# Patient Record
Sex: FEMALE | Race: WHITE
[De-identification: names, ages, dates, MRNs, and addresses within clinical notes are randomized per-mention and may not be internally consistent; named-entity substitution may affect disease eponyms.]

---

## 2018-12-28 ENCOUNTER — HOSPITAL ENCOUNTER (EMERGENCY)
Dept: HOSPITAL 47 - EC | Age: 28
Discharge: HOME | End: 2018-12-28
Payer: COMMERCIAL

## 2018-12-28 VITALS — TEMPERATURE: 98.1 F

## 2018-12-28 VITALS — RESPIRATION RATE: 16 BRPM | HEART RATE: 66 BPM | SYSTOLIC BLOOD PRESSURE: 113 MMHG | DIASTOLIC BLOOD PRESSURE: 52 MMHG

## 2018-12-28 DIAGNOSIS — F32.9: Primary | ICD-10-CM

## 2018-12-28 DIAGNOSIS — F41.9: ICD-10-CM

## 2018-12-28 DIAGNOSIS — F17.200: ICD-10-CM

## 2018-12-28 DIAGNOSIS — Z79.899: ICD-10-CM

## 2018-12-28 PROCEDURE — 80306 DRUG TEST PRSMV INSTRMNT: CPT

## 2018-12-28 PROCEDURE — 99285 EMERGENCY DEPT VISIT HI MDM: CPT

## 2018-12-28 NOTE — ED
General Adult HPI





- General


Chief complaint: Psychiatric Symptoms


Stated complaint: petitioned


Time Seen by Provider: 18 13:21


Source: patient, police, RN notes reviewed, old records reviewed


Mode of arrival: ambulatory


Limitations: no limitations





- History of Present Illness


Initial comments: 





28-year-old female presents for psychiatric evaluation.  Patient was petitioned 

by her mother and father.  She does have past medical history of depression, 

was placed on Wellbutrin approximately one month ago.  The petitioned states 

that the patient has had increased aggression, she reportedly text her 

boyfriend that she was suicidal.  Patient denies this.  Patient denies any 

suicidal homicidal ideation.  Denies suicide attempt.  No past medical history, 

no pain complaints, no physical complaints today.  Patient is agreeable and 

compliant with history of physical.





- Related Data


 Home Medications











 Medication  Instructions  Recorded  Confirmed


 


Ergocalciferol (Vitamin D2) 50,000 unit PO GALEANA 18





[Vitamin D2]   


 


buPROPion HCL [Wellbutrin SR] 150 mg PO Q12H 18











 Allergies











Allergy/AdvReac Type Severity Reaction Status Date / Time


 


No Known Allergies Allergy   Verified 18 13:32














Review of Systems


ROS Statement: 


Those systems with pertinent positive or pertinent negative responses have been 

documented in the HPI.





ROS Other: All systems not noted in ROS Statement are negative.





Past Medical History


Past Medical History: No Reported History


History of Any Multi-Drug Resistant Organisms: None Reported


Past Surgical History:  Section


Past Psychological History: Anxiety, Depression


Smoking Status: Current every day smoker


Past Alcohol Use History: Occasional


Past Drug Use History: None Reported





General Exam


Limitations: no limitations


General appearance: alert, in no apparent distress


Head exam: Present: atraumatic, normocephalic


Eye exam: Present: normal appearance, PERRL


ENT exam: Present: normal exam


Neck exam: Present: normal inspection.  Absent: tenderness, meningismus


Respiratory exam: Present: normal lung sounds bilaterally.  Absent: respiratory 

distress, wheezes


Cardiovascular Exam: Present: regular rate, normal rhythm


GI/Abdominal exam: Present: soft.  Absent: distended, tenderness, guarding


Extremities exam: Present: normal inspection, normal capillary refill


Back exam: Present: normal inspection


Neurological exam: Present: alert, oriented X3


Psychiatric exam: Present: normal affect, normal mood.  Absent: agitated, 

anxious, homicidal ideation, suicidal ideation


Skin exam: Present: warm, dry, intact.  Absent: cyanosis, diaphoretic





Course


 Vital Signs











  18





  12:56


 


Temperature 98.1 F


 


Pulse Rate 85


 


Respiratory 18





Rate 


 


Blood Pressure 116/74


 


O2 Sat by Pulse 99





Oximetry 














Medical Decision Making





- Medical Decision Making





28-year-old female presents for petitioned mental evaluation.  Patient is 

denying any suicidal ideation.  She is calm and appropriate throughout my 

interaction.  She is evaluated by EPS, plan for discharge, she has outpatient 

follow-up in one week.  Patient will return with worsening or changing symptoms.





- Lab Data


 Lab Results











  18 Range/Units





  13:20 


 


Urine Opiates Screen  Not Detected  (NotDetected)  


 


Ur Oxycodone Screen  Not Detected  (NotDetected)  


 


Urine Methadone Screen  Not Detected  (NotDetected)  


 


Ur Propoxyphene Screen  Not Detected  (NotDetected)  


 


Ur Barbiturates Screen  Not Detected  (NotDetected)  


 


U Tricyclic Antidepress  Not Detected  (NotDetected)  


 


Ur Phencyclidine Scrn  Not Detected  (NotDetected)  


 


Ur Amphetamines Screen  Not Detected  (NotDetected)  


 


U Methamphetamines Scrn  Not Detected  (NotDetected)  


 


U Benzodiazepines Scrn  Not Detected  (NotDetected)  


 


Urine Cocaine Screen  Not Detected  (NotDetected)  


 


U Marijuana (THC) Screen  Detected H  (NotDetected)  














Disposition


Clinical Impression: 


 Depression





Disposition: HOME SELF-CARE


Condition: Good


Instructions:  Depression (ED)


Additional Instructions: 


Please follow up with community mental health.


Is patient prescribed a controlled substance at d/c from ED?: No


Referrals: 


Nonstaff,Physician [Primary Care Provider] - 1-2 days


Time of Disposition: 16:31

## 2020-05-13 ENCOUNTER — HOSPITAL ENCOUNTER (EMERGENCY)
Dept: HOSPITAL 47 - EC | Age: 30
Discharge: HOME | End: 2020-05-13
Payer: COMMERCIAL

## 2020-05-13 VITALS — RESPIRATION RATE: 18 BRPM | TEMPERATURE: 100.3 F

## 2020-05-13 VITALS — SYSTOLIC BLOOD PRESSURE: 119 MMHG | DIASTOLIC BLOOD PRESSURE: 74 MMHG

## 2020-05-13 VITALS — HEART RATE: 99 BPM

## 2020-05-13 DIAGNOSIS — N39.0: ICD-10-CM

## 2020-05-13 DIAGNOSIS — Z20.828: ICD-10-CM

## 2020-05-13 DIAGNOSIS — Z79.899: ICD-10-CM

## 2020-05-13 DIAGNOSIS — N12: Primary | ICD-10-CM

## 2020-05-13 DIAGNOSIS — M54.9: ICD-10-CM

## 2020-05-13 DIAGNOSIS — F17.200: ICD-10-CM

## 2020-05-13 DIAGNOSIS — F32.9: ICD-10-CM

## 2020-05-13 DIAGNOSIS — R51: ICD-10-CM

## 2020-05-13 LAB
ANION GAP SERPL CALC-SCNC: 7 MMOL/L
BASOPHILS # BLD AUTO: 0 K/UL (ref 0–0.2)
BASOPHILS NFR BLD AUTO: 0 %
BUN SERPL-SCNC: 8 MG/DL (ref 7–17)
CALCIUM SPEC-MCNC: 8.9 MG/DL (ref 8.4–10.2)
CHLORIDE SERPL-SCNC: 104 MMOL/L (ref 98–107)
CO2 SERPL-SCNC: 25 MMOL/L (ref 22–30)
EOSINOPHIL # BLD AUTO: 0.1 K/UL (ref 0–0.7)
EOSINOPHIL NFR BLD AUTO: 1 %
ERYTHROCYTE [DISTWIDTH] IN BLOOD BY AUTOMATED COUNT: 4.11 M/UL (ref 3.8–5.4)
ERYTHROCYTE [DISTWIDTH] IN BLOOD: 12.8 % (ref 11.5–15.5)
GLUCOSE SERPL-MCNC: 102 MG/DL (ref 74–99)
HCT VFR BLD AUTO: 39.7 % (ref 34–46)
HGB BLD-MCNC: 12.7 GM/DL (ref 11.4–16)
LYMPHOCYTES # SPEC AUTO: 0.7 K/UL (ref 1–4.8)
LYMPHOCYTES NFR SPEC AUTO: 5 %
MAGNESIUM SPEC-SCNC: 2 MG/DL (ref 1.6–2.3)
MCH RBC QN AUTO: 30.8 PG (ref 25–35)
MCHC RBC AUTO-ENTMCNC: 31.8 G/DL (ref 31–37)
MCV RBC AUTO: 96.6 FL (ref 80–100)
MONOCYTES # BLD AUTO: 1 K/UL (ref 0–1)
MONOCYTES NFR BLD AUTO: 8 %
NEUTROPHILS # BLD AUTO: 11.2 K/UL (ref 1.3–7.7)
NEUTROPHILS NFR BLD AUTO: 86 %
PH UR: 5.5 [PH] (ref 5–8)
PLATELET # BLD AUTO: 262 K/UL (ref 150–450)
POTASSIUM SERPL-SCNC: 3.8 MMOL/L (ref 3.5–5.1)
PROT UR QL: (no result)
RBC UR QL: 5 /HPF (ref 0–5)
SODIUM SERPL-SCNC: 136 MMOL/L (ref 137–145)
SP GR UR: 1.02 (ref 1–1.03)
SQUAMOUS UR QL AUTO: 7 /HPF (ref 0–4)
UROBILINOGEN UR QL STRIP: <2 MG/DL (ref ?–2)
WBC # BLD AUTO: 13.1 K/UL (ref 3.8–10.6)
WBC # UR AUTO: 84 /HPF (ref 0–5)

## 2020-05-13 PROCEDURE — 85025 COMPLETE CBC W/AUTO DIFF WBC: CPT

## 2020-05-13 PROCEDURE — 87086 URINE CULTURE/COLONY COUNT: CPT

## 2020-05-13 PROCEDURE — 87040 BLOOD CULTURE FOR BACTERIA: CPT

## 2020-05-13 PROCEDURE — 81025 URINE PREGNANCY TEST: CPT

## 2020-05-13 PROCEDURE — 96374 THER/PROPH/DIAG INJ IV PUSH: CPT

## 2020-05-13 PROCEDURE — 86140 C-REACTIVE PROTEIN: CPT

## 2020-05-13 PROCEDURE — 96375 TX/PRO/DX INJ NEW DRUG ADDON: CPT

## 2020-05-13 PROCEDURE — 96361 HYDRATE IV INFUSION ADD-ON: CPT

## 2020-05-13 PROCEDURE — 87635 SARS-COV-2 COVID-19 AMP PRB: CPT

## 2020-05-13 PROCEDURE — 81001 URINALYSIS AUTO W/SCOPE: CPT

## 2020-05-13 PROCEDURE — 99284 EMERGENCY DEPT VISIT MOD MDM: CPT

## 2020-05-13 PROCEDURE — 71045 X-RAY EXAM CHEST 1 VIEW: CPT

## 2020-05-13 PROCEDURE — 36415 COLL VENOUS BLD VENIPUNCTURE: CPT

## 2020-05-13 PROCEDURE — 83605 ASSAY OF LACTIC ACID: CPT

## 2020-05-13 PROCEDURE — 93005 ELECTROCARDIOGRAM TRACING: CPT

## 2020-05-13 PROCEDURE — 83735 ASSAY OF MAGNESIUM: CPT

## 2020-05-13 PROCEDURE — 80048 BASIC METABOLIC PNL TOTAL CA: CPT

## 2020-05-13 NOTE — ED
General Adult HPI





- General


Chief complaint: Fever


Stated complaint: fever/vomiting


Time Seen by Provider: 20 15:54


Source: patient


Mode of arrival: ambulatory


Limitations: no limitations





- History of Present Illness


Initial comments: 


Dictation was produced using dragon dictation software. please excuse any 

grammatical, word or spelling errors. 





This patient was cared for during a federal and state declared state of 

emergency secondary to Covid 19





Chief Complaint: 29-year-old female no significant past medical history presents

with fever, chills, myalgias, arthralgias and headache





History of Present Illness: She is a 29-year-old female she reports that this 

morning she woke up with fever, chills, myalgias or arthralgias and headache.  

Patient states that she does not have any medical problems.  She has no known 

obvious exposure with anyone who has coronavirus or coronavirus-type symptoms.  

States that her friend works for the gas station perhaps maybe she may have 

gotten an infection from her.  She denies pregnancy.  She states she also does 

have a headache.  She reports that Tylenol improved her headache.  Patient has 

had emesis is nonbloody was nonbloody.  States that when she vomits or she goes 

numb.  Denies any sore throat.  No rhinorrhea.  No paresthesias or weakness to 

the extremities.  Denies any neck symptoms.





The ROS documented in this emergency department record has been reviewed and 

confirmed by me.  Those systems with pertinent positive or negative responses 

have been documented in the HPI.  All other systems are other negative and/or 

noncontributory.








PHYSICAL EXAM:


General Impression: Alert and oriented x3, not in acute distress


HEENT: Normocephalic atraumatic, extra-ocular movements intact, pupils equal and

reactive to light bilaterally, mucous membranes moist.


Cardiovascular: Heart regular rate and rhythm


Chest: Able to complete full sentences, no retractions, no tachypnea


Abdomen: abdomen soft, non-tender, non-distended, no organomegaly


Musculoskeletal: Pulses present and equal in all extremities, no peripheral 

edema


Motor:  no focal deficits noted


Neurological: CN II-XII grossly intact, no focal motor or sensory deficits 

noted, negative Kernig's, negative Brudzinski's, negative Lhermitte's


Skin: Intact with no visualized rashes


Psych: Normal affect and mood





ED course: 29-year-old female presents with constitutional symptoms 1 day.  

Vital signs upon arrival shows temperature 100.3, heart rate 111, salicylate 

acceptable limits.


Leukocytosis of 13.1, neutrophils of 11.2.  Lymphocytes of 0.7 metabolic panel 

is unremarkable.  There is C-reactive protein of 61.5.  Urinalysis positive for 

urinary tract infections with 84 white blood cells and nitrite positive.  No lac

tic acidosis.  Urine pregnancy is negative.  Coronal virus tests sent pending 

results and 24-48 hours.  More history was obtained from patient.  Patient does 

have CVA tenderness to the right.  She did report having increased back pain she

thought was from a back strain.  Clinical presentation consistent with 

pyelonephritis.  Patient still feels slightly ill.  She is given fluids, 

analgesics and antipyretics.  Patient was observed in emergency department for 

several hours.  She is reevaluated after administration of intravenous fluids 

and ceftriaxone.  Patient feels well.  Patient will that she has bacterial 

infection to her kidney.  She is told to return to the emergency Department with

any worsening symptoms.  Patient understands strict return precautions.  

Otherwise patient's provided antibiotics and discharged.














- Related Data


                                Home Medications











 Medication  Instructions  Recorded  Confirmed


 


Ergocalciferol (Vitamin D2) 50,000 unit PO GALEANA 18





[Vitamin D2]   


 


buPROPion HCL [Wellbutrin SR] 150 mg PO Q12H 18








                                  Previous Rx's











 Medication  Instructions  Recorded


 


Cephalexin [Keflex] 500 mg PO Q6HR 14 Days #56 cap 20


 


Ondansetron Odt [Zofran Odt] 4 mg PO Q8HR PRN #12 tab 20











                                    Allergies











Allergy/AdvReac Type Severity Reaction Status Date / Time


 


No Known Allergies Allergy   Verified 20 15:47














Review of Systems


ROS Statement: 


Those systems with pertinent positive or pertinent negative responses have been 

documented in the HPI.





ROS Other: All systems not noted in ROS Statement are negative.





Past Medical History


Past Medical History: No Reported History


History of Any Multi-Drug Resistant Organisms: None Reported


Past Surgical History:  Section


Past Psychological History: Anxiety, Depression


Smoking Status: Current every day smoker


Past Alcohol Use History: Occasional


Past Drug Use History: None Reported





General Exam


Limitations: no limitations





Course


                                   Vital Signs











  20





  15:45 16:56 17:54


 


Temperature 100.3 F H  100.3 F H


 


Pulse Rate 111 H 99 99


 


Respiratory 18 18 18





Rate   


 


Blood Pressure 124/79 118/73 119/74


 


O2 Sat by Pulse 100 99 98





Oximetry   














Medical Decision Making





- Lab Data


Result diagrams: 


                                 20 16:15





                                 20 16:15


                                   Lab Results











  20 Range/Units





  16:15 16:15 16:15 


 


WBC  13.1 H    (3.8-10.6)  k/uL


 


RBC  4.11    (3.80-5.40)  m/uL


 


Hgb  12.7    (11.4-16.0)  gm/dL


 


Hct  39.7    (34.0-46.0)  %


 


MCV  96.6    (80.0-100.0)  fL


 


MCH  30.8    (25.0-35.0)  pg


 


MCHC  31.8    (31.0-37.0)  g/dL


 


RDW  12.8    (11.5-15.5)  %


 


Plt Count  262    (150-450)  k/uL


 


Neutrophils %  86    %


 


Lymphocytes %  5    %


 


Monocytes %  8    %


 


Eosinophils %  1    %


 


Basophils %  0    %


 


Neutrophils #  11.2 H    (1.3-7.7)  k/uL


 


Lymphocytes #  0.7 L    (1.0-4.8)  k/uL


 


Monocytes #  1.0    (0-1.0)  k/uL


 


Eosinophils #  0.1    (0-0.7)  k/uL


 


Basophils #  0.0    (0-0.2)  k/uL


 


Sodium    136 L  (137-145)  mmol/L


 


Potassium    3.8  (3.5-5.1)  mmol/L


 


Chloride    104  ()  mmol/L


 


Carbon Dioxide    25  (22-30)  mmol/L


 


Anion Gap    7  mmol/L


 


BUN    8  (7-17)  mg/dL


 


Creatinine    0.50 L  (0.52-1.04)  mg/dL


 


Est GFR (CKD-EPI)AfAm    >90  (>60 ml/min/1.73 sqM)  


 


Est GFR (CKD-EPI)NonAf    >90  (>60 ml/min/1.73 sqM)  


 


Glucose    102 H  (74-99)  mg/dL


 


Plasma Lactic Acid Jeremy     (0.7-2.0)  mmol/L


 


Calcium    8.9  (8.4-10.2)  mg/dL


 


Magnesium    2.0  (1.6-2.3)  mg/dL


 


C-Reactive Protein    61.5 H  (<10.0)  mg/L


 


Urine Color   Yellow   


 


Urine Appearance   Cloudy H   (Clear)  


 


Urine pH   5.5   (5.0-8.0)  


 


Ur Specific Gravity   1.020   (1.001-1.035)  


 


Urine Protein   1+ H   (Negative)  


 


Urine Glucose (UA)   Negative   (Negative)  


 


Urine Ketones   Negative   (Negative)  


 


Urine Blood   Small H   (Negative)  


 


Urine Nitrite   Positive H   (Negative)  


 


Urine Bilirubin   Negative   (Negative)  


 


Urine Urobilinogen   <2.0   (<2.0)  mg/dL


 


Ur Leukocyte Esterase   Large H   (Negative)  


 


Urine RBC   5   (0-5)  /hpf


 


Urine WBC   84 H   (0-5)  /hpf


 


Ur Squamous Epith Cells   7 H   (0-4)  /hpf


 


Amorphous Sediment   Occasional H   (None)  /hpf


 


Urine Bacteria   Occasional H   (None)  /hpf


 


Urine Mucus   Many H   (None)  /hpf


 


Urine HCG, Qual     (Not Detectd)  














  20 Range/Units





  16:15 16:15 


 


WBC    (3.8-10.6)  k/uL


 


RBC    (3.80-5.40)  m/uL


 


Hgb    (11.4-16.0)  gm/dL


 


Hct    (34.0-46.0)  %


 


MCV    (80.0-100.0)  fL


 


MCH    (25.0-35.0)  pg


 


MCHC    (31.0-37.0)  g/dL


 


RDW    (11.5-15.5)  %


 


Plt Count    (150-450)  k/uL


 


Neutrophils %    %


 


Lymphocytes %    %


 


Monocytes %    %


 


Eosinophils %    %


 


Basophils %    %


 


Neutrophils #    (1.3-7.7)  k/uL


 


Lymphocytes #    (1.0-4.8)  k/uL


 


Monocytes #    (0-1.0)  k/uL


 


Eosinophils #    (0-0.7)  k/uL


 


Basophils #    (0-0.2)  k/uL


 


Sodium    (137-145)  mmol/L


 


Potassium    (3.5-5.1)  mmol/L


 


Chloride    ()  mmol/L


 


Carbon Dioxide    (22-30)  mmol/L


 


Anion Gap    mmol/L


 


BUN    (7-17)  mg/dL


 


Creatinine    (0.52-1.04)  mg/dL


 


Est GFR (CKD-EPI)AfAm    (>60 ml/min/1.73 sqM)  


 


Est GFR (CKD-EPI)NonAf    (>60 ml/min/1.73 sqM)  


 


Glucose    (74-99)  mg/dL


 


Plasma Lactic Acid Jeremy  0.8   (0.7-2.0)  mmol/L


 


Calcium    (8.4-10.2)  mg/dL


 


Magnesium    (1.6-2.3)  mg/dL


 


C-Reactive Protein    (<10.0)  mg/L


 


Urine Color    


 


Urine Appearance    (Clear)  


 


Urine pH    (5.0-8.0)  


 


Ur Specific Gravity    (1.001-1.035)  


 


Urine Protein    (Negative)  


 


Urine Glucose (UA)    (Negative)  


 


Urine Ketones    (Negative)  


 


Urine Blood    (Negative)  


 


Urine Nitrite    (Negative)  


 


Urine Bilirubin    (Negative)  


 


Urine Urobilinogen    (<2.0)  mg/dL


 


Ur Leukocyte Esterase    (Negative)  


 


Urine RBC    (0-5)  /hpf


 


Urine WBC    (0-5)  /hpf


 


Ur Squamous Epith Cells    (0-4)  /hpf


 


Amorphous Sediment    (None)  /hpf


 


Urine Bacteria    (None)  /hpf


 


Urine Mucus    (None)  /hpf


 


Urine HCG, Qual   Not Detected  (Not Detectd)  














Disposition


Clinical Impression: 


 Pyelonephritis





Disposition: HOME SELF-CARE


Condition: Fair


Instructions (If sedation given, give patient instructions):  Fever in Adults 

(ED), Kidney Infection (ED)


Prescriptions: 


Cephalexin [Keflex] 500 mg PO Q6HR 14 Days #56 cap


Ondansetron Odt [Zofran Odt] 4 mg PO Q8HR PRN #12 tab


 PRN Reason: Nausea


Is patient prescribed a controlled substance at d/c from ED?: No


Referrals: 


Isidra Pantoja MD [REFERRING] - 1-2 days


Time of Disposition: 18:03

## 2020-05-13 NOTE — XR
EXAMINATION TYPE: XR chest 1V portable

 

DATE OF EXAM: 5/13/2020

 

COMPARISON: NONE

 

HISTORY: Fever chills and vomiting all day.

 

TECHNIQUE: Single AP portable frontal upright view of the chest is obtained.

 

FINDINGS:  There is no focal air space opacity, pleural effusion, or pneumothorax seen.  The cardiac 
silhouette size is within normal limits.   The osseous structures are intact.

 

IMPRESSION:  No suspicious acute infiltrate.

## 2020-05-14 ENCOUNTER — HOSPITAL ENCOUNTER (EMERGENCY)
Dept: HOSPITAL 47 - EC | Age: 30
Discharge: HOME | End: 2020-05-14
Payer: COMMERCIAL

## 2020-05-14 VITALS
DIASTOLIC BLOOD PRESSURE: 71 MMHG | HEART RATE: 82 BPM | TEMPERATURE: 98.4 F | SYSTOLIC BLOOD PRESSURE: 131 MMHG | RESPIRATION RATE: 16 BRPM

## 2020-05-14 DIAGNOSIS — F17.200: ICD-10-CM

## 2020-05-14 DIAGNOSIS — F41.9: ICD-10-CM

## 2020-05-14 DIAGNOSIS — Z79.899: ICD-10-CM

## 2020-05-14 DIAGNOSIS — R82.4: ICD-10-CM

## 2020-05-14 DIAGNOSIS — N12: Primary | ICD-10-CM

## 2020-05-14 DIAGNOSIS — E86.0: ICD-10-CM

## 2020-05-14 DIAGNOSIS — F32.9: ICD-10-CM

## 2020-05-14 DIAGNOSIS — R51: ICD-10-CM

## 2020-05-14 LAB
ALBUMIN SERPL-MCNC: 3.8 G/DL (ref 3.5–5)
ALP SERPL-CCNC: 60 U/L (ref 38–126)
ALT SERPL-CCNC: 10 U/L (ref 4–34)
ANION GAP SERPL CALC-SCNC: 9 MMOL/L
AST SERPL-CCNC: 17 U/L (ref 14–36)
BASOPHILS # BLD AUTO: 0 K/UL (ref 0–0.2)
BASOPHILS NFR BLD AUTO: 0 %
BUN SERPL-SCNC: 11 MG/DL (ref 7–17)
CALCIUM SPEC-MCNC: 8.8 MG/DL (ref 8.4–10.2)
CHLORIDE SERPL-SCNC: 101 MMOL/L (ref 98–107)
CO2 SERPL-SCNC: 26 MMOL/L (ref 22–30)
EOSINOPHIL # BLD AUTO: 0.1 K/UL (ref 0–0.7)
EOSINOPHIL NFR BLD AUTO: 0 %
ERYTHROCYTE [DISTWIDTH] IN BLOOD BY AUTOMATED COUNT: 3.85 M/UL (ref 3.8–5.4)
ERYTHROCYTE [DISTWIDTH] IN BLOOD: 13.1 % (ref 11.5–15.5)
GLUCOSE SERPL-MCNC: 85 MG/DL (ref 74–99)
HCT VFR BLD AUTO: 36.8 % (ref 34–46)
HGB BLD-MCNC: 12.5 GM/DL (ref 11.4–16)
KETONES UR QL STRIP.AUTO: (no result)
LYMPHOCYTES # SPEC AUTO: 1.4 K/UL (ref 1–4.8)
LYMPHOCYTES NFR SPEC AUTO: 11 %
MCH RBC QN AUTO: 32.4 PG (ref 25–35)
MCHC RBC AUTO-ENTMCNC: 33.8 G/DL (ref 31–37)
MCV RBC AUTO: 95.6 FL (ref 80–100)
MONOCYTES # BLD AUTO: 1.2 K/UL (ref 0–1)
MONOCYTES NFR BLD AUTO: 10 %
NEUTROPHILS # BLD AUTO: 9.1 K/UL (ref 1.3–7.7)
NEUTROPHILS NFR BLD AUTO: 77 %
PH UR: 6 [PH] (ref 5–8)
PLATELET # BLD AUTO: 270 K/UL (ref 150–450)
POTASSIUM SERPL-SCNC: 3.6 MMOL/L (ref 3.5–5.1)
PROT SERPL-MCNC: 6.9 G/DL (ref 6.3–8.2)
PROT UR QL: (no result)
RBC UR QL: 25 /HPF (ref 0–5)
SODIUM SERPL-SCNC: 136 MMOL/L (ref 137–145)
SP GR UR: 1.03 (ref 1–1.03)
SQUAMOUS UR QL AUTO: 49 /HPF (ref 0–4)
UROBILINOGEN UR QL STRIP: 3 MG/DL (ref ?–2)
WBC # BLD AUTO: 11.9 K/UL (ref 3.8–10.6)
WBC # UR AUTO: 49 /HPF (ref 0–5)

## 2020-05-14 PROCEDURE — 96375 TX/PRO/DX INJ NEW DRUG ADDON: CPT

## 2020-05-14 PROCEDURE — 96361 HYDRATE IV INFUSION ADD-ON: CPT

## 2020-05-14 PROCEDURE — 85025 COMPLETE CBC W/AUTO DIFF WBC: CPT

## 2020-05-14 PROCEDURE — 99284 EMERGENCY DEPT VISIT MOD MDM: CPT

## 2020-05-14 PROCEDURE — 81001 URINALYSIS AUTO W/SCOPE: CPT

## 2020-05-14 PROCEDURE — 96374 THER/PROPH/DIAG INJ IV PUSH: CPT

## 2020-05-14 PROCEDURE — 80053 COMPREHEN METABOLIC PANEL: CPT

## 2020-05-14 PROCEDURE — 87086 URINE CULTURE/COLONY COUNT: CPT

## 2020-05-14 PROCEDURE — 36415 COLL VENOUS BLD VENIPUNCTURE: CPT

## 2020-05-14 NOTE — ED
Female Urogenital HPI





- General


Chief complaint: Urogenital


Stated complaint: Kidney infection


Time Seen by Provider: 20 18:01


Source: patient


Mode of arrival: ambulatory


Limitations: no limitations





- History of Present Illness


Initial comments: 





Patient is a 29-year-old female presenting to emergency for chief complaint of 

nausea vomiting.  Patient states she was discharged yesterday from the ED after 

being diagnosed with pyelonephritis.  Patient reports she continues to have 

nausea and multiple episodes of vomiting.  He states the Zofran is not working. 

Denies any hematemesis, dysuria, hematochezia or melena.  Does report some 

dysuria.  States she is otherwise been taking Tylenol for the fever home which 

she has been able to keep it under control.  States she also has a headache that

is exacerbated after vomiting episodes.  Denies any visual changes.  Not 

orthostatic related.  Gradual onset.


Last Menstrual Period: 20





- Related Data


                                Home Medications











 Medication  Instructions  Recorded  Confirmed


 


Ergocalciferol (Vitamin D2) 50,000 unit PO GALEANA 18





[Vitamin D2]   


 


buPROPion HCL [Wellbutrin SR] 150 mg PO Q12H 18








                                  Previous Rx's











 Medication  Instructions  Recorded


 


Cephalexin [Keflex] 500 mg PO Q6HR 14 Days #56 cap 20


 


Ondansetron Odt [Zofran Odt] 4 mg PO Q8HR PRN #12 tab 20











                                    Allergies











Allergy/AdvReac Type Severity Reaction Status Date / Time


 


No Known Allergies Allergy   Verified 20 17:42














Review of Systems


ROS Statement: 


Those systems with pertinent positive or pertinent negative responses have been 

documented in the HPI.





ROS Other: All systems not noted in ROS Statement are negative.





Past Medical History


Past Medical History: No Reported History


History of Any Multi-Drug Resistant Organisms: None Reported


Past Surgical History:  Section


Past Psychological History: Anxiety, Depression


Smoking Status: Current every day smoker


Past Alcohol Use History: Occasional


Past Drug Use History: None Reported





General Exam


Limitations: no limitations


General appearance: alert, in no apparent distress


Head exam: Present: atraumatic, normocephalic, normal inspection


Eye exam: Present: normal appearance, PERRL, EOMI


Pupils: Present: normal accommodation


ENT exam: Present: normal exam, normal oropharynx, mucous membranes moist, TM's 

normal bilaterally, normal external ear exam


Neck exam: Present: normal inspection, full ROM


Respiratory exam: Present: normal lung sounds bilaterally


Cardiovascular Exam: Present: regular rate, normal rhythm, normal heart sounds


GI/Abdominal exam: Present: soft, tenderness (Left flank)


Extremities exam: Present: normal inspection, full ROM


Back exam: Present: normal inspection, full ROM, tenderness, CVA tenderness (L)


Neurological exam: Present: alert, oriented X3


Psychiatric exam: Present: normal affect, normal mood


Skin exam: Present: warm, dry, intact, normal color





Course


                                   Vital Signs











  20





  17:40 20:03


 


Temperature 99.0 F 98.4 F


 


Pulse Rate 87 82


 


Respiratory 18 16





Rate  


 


Blood Pressure 111/74 131/71


 


O2 Sat by Pulse 98 100





Oximetry  














Medical Decision Making





- Medical Decision Making





Patient is a 29-year-old female recently diagnosed with pyelonephritis 

presenting to the emergency department with a chief complaint of nausea and 

vomiting and abdominal pain.  Physical examination patient does appear to have 

CVA tenderness.  Patient was given fluids and antiemetics.  On reevaluation 

patient reports significant improvement in symptoms.  I suspect the headache and

general fatigue was due to dehydration.  Patient did have dry mucous members.  

Dehydration is also evident with elevated ketones in the urine.  Patient was 

advised to drink lots of fluids at home especially Gatorade or Pedialyte.  

Patient ready hasn't of Zofran at home.  Return parameters thoroughly discussed.

 Case discussed physician.





- Lab Data


Result diagrams: 


                                 20 19:30





                                 20 19:30


                                   Lab Results











  20 Range/Units





  18:36 19:30 19:30 


 


WBC   11.9 H   (3.8-10.6)  k/uL


 


RBC   3.85   (3.80-5.40)  m/uL


 


Hgb   12.5   (11.4-16.0)  gm/dL


 


Hct   36.8   (34.0-46.0)  %


 


MCV   95.6   (80.0-100.0)  fL


 


MCH   32.4   (25.0-35.0)  pg


 


MCHC   33.8   (31.0-37.0)  g/dL


 


RDW   13.1   (11.5-15.5)  %


 


Plt Count   270   (150-450)  k/uL


 


Neutrophils %   77   %


 


Lymphocytes %   11   %


 


Monocytes %   10   %


 


Eosinophils %   0   %


 


Basophils %   0   %


 


Neutrophils #   9.1 H   (1.3-7.7)  k/uL


 


Lymphocytes #   1.4   (1.0-4.8)  k/uL


 


Monocytes #   1.2 H   (0-1.0)  k/uL


 


Eosinophils #   0.1   (0-0.7)  k/uL


 


Basophils #   0.0   (0-0.2)  k/uL


 


Sodium    136 L  (137-145)  mmol/L


 


Potassium    3.6  (3.5-5.1)  mmol/L


 


Chloride    101  ()  mmol/L


 


Carbon Dioxide    26  (22-30)  mmol/L


 


Anion Gap    9  mmol/L


 


BUN    11  (7-17)  mg/dL


 


Creatinine    0.47 L  (0.52-1.04)  mg/dL


 


Est GFR (CKD-EPI)AfAm    >90  (>60 ml/min/1.73 sqM)  


 


Est GFR (CKD-EPI)NonAf    >90  (>60 ml/min/1.73 sqM)  


 


Glucose    85  (74-99)  mg/dL


 


Calcium    8.8  (8.4-10.2)  mg/dL


 


Total Bilirubin    0.9  (0.2-1.3)  mg/dL


 


AST    17  (14-36)  U/L


 


ALT    10  (4-34)  U/L


 


Alkaline Phosphatase    60  ()  U/L


 


Total Protein    6.9  (6.3-8.2)  g/dL


 


Albumin    3.8  (3.5-5.0)  g/dL


 


Urine Color  Yellow    


 


Urine Appearance  Turbid H    (Clear)  


 


Urine pH  6.0    (5.0-8.0)  


 


Ur Specific Gravity  1.030    (1.001-1.035)  


 


Urine Protein  2+ H    (Negative)  


 


Urine Glucose (UA)  Negative    (Negative)  


 


Urine Ketones  2+ H    (Negative)  


 


Urine Blood  Negative    (Negative)  


 


Urine Nitrite  Negative    (Negative)  


 


Urine Bilirubin  Negative    (Negative)  


 


Urine Urobilinogen  3.0    (<2.0)  mg/dL


 


Ur Leukocyte Esterase  Large H    (Negative)  


 


Urine RBC  25 H    (0-5)  /hpf


 


Urine WBC  49 H    (0-5)  /hpf


 


Ur Squamous Epith Cells  49 H    (0-4)  /hpf


 


Urine Mucus  Many H    (None)  /hpf














Disposition


Clinical Impression: 


 Nausea & vomiting, Abdominal pain, Pyelonephritis





Disposition: HOME SELF-CARE


Condition: Good


Instructions (If sedation given, give patient instructions):  Abdominal Pain (E

D)


Additional Instructions: 


Drink lots of fluids.  Take prescribed medication as directed.  Return to 

emergency department if symptoms worsen.


Is patient prescribed a controlled substance at d/c from ED?: No


Referrals: 


None,Stated [Primary Care Provider] - 1-2 days


Time of Disposition: 20:18

## 2020-07-31 ENCOUNTER — HOSPITAL ENCOUNTER (INPATIENT)
Dept: HOSPITAL 47 - EC | Age: 30
Discharge: ANOTHER HOSPITAL | DRG: 537 | End: 2020-07-31
Attending: ORTHOPAEDIC SURGERY | Admitting: ORTHOPAEDIC SURGERY
Payer: COMMERCIAL

## 2020-07-31 VITALS — TEMPERATURE: 97.1 F | HEART RATE: 96 BPM | SYSTOLIC BLOOD PRESSURE: 120 MMHG | DIASTOLIC BLOOD PRESSURE: 80 MMHG

## 2020-07-31 VITALS — RESPIRATION RATE: 16 BRPM
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
## 2020-07-31 DIAGNOSIS — S72.052A: ICD-10-CM
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
## 2020-07-31 DIAGNOSIS — Z80.49: ICD-10-CM

## 2020-07-31 DIAGNOSIS — S32.019A: ICD-10-CM

## 2020-07-31 DIAGNOSIS — S73.015A: Primary | ICD-10-CM

## 2020-07-31 DIAGNOSIS — Z11.59: ICD-10-CM

## 2020-07-31 DIAGNOSIS — Z80.8: ICD-10-CM

## 2020-07-31 DIAGNOSIS — F41.9: ICD-10-CM

## 2020-07-31 DIAGNOSIS — Z87.891: ICD-10-CM

## 2020-07-31 DIAGNOSIS — M25.521: ICD-10-CM

## 2020-07-31 DIAGNOSIS — Z98.891: ICD-10-CM

## 2020-07-31 DIAGNOSIS — F32.9: ICD-10-CM

## 2020-07-31 DIAGNOSIS — Z79.899: ICD-10-CM

## 2020-07-31 DIAGNOSIS — Z82.49: ICD-10-CM

## 2020-07-31 LAB
ALBUMIN SERPL-MCNC: 4.3 G/DL (ref 3.5–5)
ALP SERPL-CCNC: 69 U/L (ref 38–126)
ALT SERPL-CCNC: 15 U/L (ref 4–34)
ANION GAP SERPL CALC-SCNC: 14 MMOL/L
APTT BLD: 21.5 SEC (ref 22–30)
AST SERPL-CCNC: 29 U/L (ref 14–36)
BASOPHILS # BLD AUTO: 0.1 K/UL (ref 0–0.2)
BASOPHILS NFR BLD AUTO: 0 %
BUN SERPL-SCNC: 9 MG/DL (ref 7–17)
CALCIUM SPEC-MCNC: 9.3 MG/DL (ref 8.4–10.2)
CHLORIDE SERPL-SCNC: 105 MMOL/L (ref 98–107)
CK SERPL-CCNC: 197 U/L (ref 30–135)
CO2 SERPL-SCNC: 18 MMOL/L (ref 22–30)
EOSINOPHIL # BLD AUTO: 0.1 K/UL (ref 0–0.7)
EOSINOPHIL NFR BLD AUTO: 1 %
ERYTHROCYTE [DISTWIDTH] IN BLOOD BY AUTOMATED COUNT: 4.23 M/UL (ref 3.8–5.4)
ERYTHROCYTE [DISTWIDTH] IN BLOOD: 13 % (ref 11.5–15.5)
GLUCOSE SERPL-MCNC: 100 MG/DL (ref 74–99)
HCT VFR BLD AUTO: 39.6 % (ref 34–46)
HGB BLD-MCNC: 13.1 GM/DL (ref 11.4–16)
INR PPP: 1 (ref ?–1.2)
KETONES UR QL STRIP.AUTO: (no result)
LYMPHOCYTES # SPEC AUTO: 2.2 K/UL (ref 1–4.8)
LYMPHOCYTES NFR SPEC AUTO: 17 %
MCH RBC QN AUTO: 31 PG (ref 25–35)
MCHC RBC AUTO-ENTMCNC: 33.2 G/DL (ref 31–37)
MCV RBC AUTO: 93.5 FL (ref 80–100)
MONOCYTES # BLD AUTO: 0.7 K/UL (ref 0–1)
MONOCYTES NFR BLD AUTO: 6 %
NEUTROPHILS # BLD AUTO: 9.5 K/UL (ref 1.3–7.7)
NEUTROPHILS NFR BLD AUTO: 75 %
PH UR: 5.5 [PH] (ref 5–8)
PLATELET # BLD AUTO: 382 K/UL (ref 150–450)
POTASSIUM SERPL-SCNC: 3.7 MMOL/L (ref 3.5–5.1)
PROT SERPL-MCNC: 7.2 G/DL (ref 6.3–8.2)
PT BLD: 10.5 SEC (ref 9–12)
SODIUM SERPL-SCNC: 137 MMOL/L (ref 137–145)
SP GR UR: 1.03 (ref 1–1.03)
SQUAMOUS UR QL AUTO: 2 /HPF (ref 0–4)
UROBILINOGEN UR QL STRIP: <2 MG/DL (ref ?–2)
WBC # BLD AUTO: 12.6 K/UL (ref 3.8–10.6)
WBC #/AREA URNS HPF: 3 /HPF (ref 0–5)

## 2020-07-31 PROCEDURE — 80320 DRUG SCREEN QUANTALCOHOLS: CPT

## 2020-07-31 PROCEDURE — 82550 ASSAY OF CK (CPK): CPT

## 2020-07-31 PROCEDURE — 70486 CT MAXILLOFACIAL W/O DYE: CPT

## 2020-07-31 PROCEDURE — 80306 DRUG TEST PRSMV INSTRMNT: CPT

## 2020-07-31 PROCEDURE — 99153 MOD SED SAME PHYS/QHP EA: CPT

## 2020-07-31 PROCEDURE — 85025 COMPLETE CBC W/AUTO DIFF WBC: CPT

## 2020-07-31 PROCEDURE — 86901 BLOOD TYPING SEROLOGIC RH(D): CPT

## 2020-07-31 PROCEDURE — 85730 THROMBOPLASTIN TIME PARTIAL: CPT

## 2020-07-31 PROCEDURE — 86850 RBC ANTIBODY SCREEN: CPT

## 2020-07-31 PROCEDURE — 86900 BLOOD TYPING SEROLOGIC ABO: CPT

## 2020-07-31 PROCEDURE — 0SSBXZZ REPOSITION LEFT HIP JOINT, EXTERNAL APPROACH: ICD-10-PCS

## 2020-07-31 PROCEDURE — 73501 X-RAY EXAM HIP UNI 1 VIEW: CPT

## 2020-07-31 PROCEDURE — 84484 ASSAY OF TROPONIN QUANT: CPT

## 2020-07-31 PROCEDURE — 74177 CT ABD & PELVIS W/CONTRAST: CPT

## 2020-07-31 PROCEDURE — 80053 COMPREHEN METABOLIC PANEL: CPT

## 2020-07-31 PROCEDURE — 96374 THER/PROPH/DIAG INJ IV PUSH: CPT

## 2020-07-31 PROCEDURE — 70450 CT HEAD/BRAIN W/O DYE: CPT

## 2020-07-31 PROCEDURE — 36415 COLL VENOUS BLD VENIPUNCTURE: CPT

## 2020-07-31 PROCEDURE — 99291 CRITICAL CARE FIRST HOUR: CPT

## 2020-07-31 PROCEDURE — 83605 ASSAY OF LACTIC ACID: CPT

## 2020-07-31 PROCEDURE — 85610 PROTHROMBIN TIME: CPT

## 2020-07-31 PROCEDURE — 81001 URINALYSIS AUTO W/SCOPE: CPT

## 2020-07-31 PROCEDURE — 72125 CT NECK SPINE W/O DYE: CPT

## 2020-07-31 PROCEDURE — 27268 CLTX THIGH FX W/MNPJ: CPT

## 2020-07-31 PROCEDURE — 71260 CT THORAX DX C+: CPT

## 2020-07-31 PROCEDURE — 84703 CHORIONIC GONADOTROPIN ASSAY: CPT

## 2020-07-31 PROCEDURE — 93005 ELECTROCARDIOGRAM TRACING: CPT

## 2020-07-31 PROCEDURE — 96361 HYDRATE IV INFUSION ADD-ON: CPT

## 2020-07-31 PROCEDURE — 99152 MOD SED SAME PHYS/QHP 5/>YRS: CPT

## 2020-07-31 PROCEDURE — 96375 TX/PRO/DX INJ NEW DRUG ADDON: CPT

## 2020-07-31 RX ADMIN — MORPHINE SULFATE PRN MG: 4 INJECTION, SOLUTION INTRAMUSCULAR; INTRAVENOUS at 10:35

## 2020-07-31 RX ADMIN — MORPHINE SULFATE PRN MG: 4 INJECTION, SOLUTION INTRAMUSCULAR; INTRAVENOUS at 08:32

## 2020-07-31 NOTE — XR
EXAMINATION TYPE: XR elbow complete RT

 

DATE OF EXAM: 7/31/2020

 

CLINICAL HISTORY: pain

 

TECHNIQUE:  Frontal, lateral and  images of the right elbow are obtained.

 

COMPARISON: None.

 

FINDINGS:  There is no acute fracture/dislocation evident of the elbow.  No abnormal fat pad signs ar
e seen.  The overlying soft tissue appears unremarkable.

 

IMPRESSION:  There is no acute fracture or dislocation of the elbow.

 

ICD 10 NO FRACTURE, INITIAL EVALUATION
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
## 2020-07-31 NOTE — CT
EXAMINATION TYPE: CT hip LT wo con

 

DATE OF EXAM: 7/31/2020

 

COMPARISON: 7/31/2020 earlier exam

 

INDICATION: Post reduction

 

DLP: 595.9 mGycm, Automated exposure control for dose reduction was used.

 

CONTRAST: None

 

Technique: CT of the left hip is performed utilizing 3 mm thick sections in the axial plane. Reconstr
ucted images in the coronal and sagittal plane are reviewed.

 

FINDINGS: There is been reduction of the previous left hip dislocation. The fracture through the femo
ral head is slightly inferior to the femoral head. No new fractures are identified.

 

IMPRESSIONS:

1.   Reduction of previous left hip dislocation.

2. Fracture of the humeral head with the fracture fragment lying inferior to the femoral head within 
the acetabulum.
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
## 2020-07-31 NOTE — XR
EXAMINATION TYPE: XR Hip Limited LT

 

DATE OF EXAM: 7/31/2020

 

COMPARISON: NONE

 

HISTORY: Post reduction

 

TECHNIQUE: Single view

 

FINDINGS: There is anatomic reduction of the left femoral head in the acetabulum. There is apparent c
hip fracture of the medial inferior femoral head and the fragment is in uncertain location and positi
on.

 

IMPRESSION: Reduction of the fracture dislocated femoral head.
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
## 2022-09-21 ENCOUNTER — HOSPITAL ENCOUNTER (EMERGENCY)
Dept: HOSPITAL 47 - EC | Age: 32
Discharge: HOME | End: 2022-09-21
Payer: COMMERCIAL

## 2022-09-21 VITALS — TEMPERATURE: 98.8 F | SYSTOLIC BLOOD PRESSURE: 121 MMHG | DIASTOLIC BLOOD PRESSURE: 81 MMHG | HEART RATE: 83 BPM

## 2022-09-21 VITALS — RESPIRATION RATE: 16 BRPM

## 2022-09-21 DIAGNOSIS — S02.5XXA: Primary | ICD-10-CM
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
## 2022-09-21 DIAGNOSIS — K02.9: ICD-10-CM

## 2022-09-21 DIAGNOSIS — Z87.891: ICD-10-CM

## 2022-09-21 PROCEDURE — 96372 THER/PROPH/DIAG INJ SC/IM: CPT

## 2022-09-21 PROCEDURE — 99282 EMERGENCY DEPT VISIT SF MDM: CPT

## 2022-09-21 NOTE — ED
ENT HPI





- General


Chief complaint: Dental/Oral


Stated complaint: dental pain


Time Seen by Provider: 22 16:04


Source: patient


Mode of arrival: ambulatory


Limitations: no limitations





- History of Present Illness


Initial comments: 


Patient is a 32-year-old female who presents to the emergency department with a 

chief complaint dental pain.  Patient states she fractured her tooth about a 

month ago which did not give her problems until today.  Patient has been dealing

with other health issues therefore states she has not been able to see a 

dentist.  Reports pain in her bottom right mouth.  Took Aleve with little 

relief.  Denies fever, chills, neck pain, neck swelling, and other concerns.








- Related Data


                                  Previous Rx's











 Medication  Instructions  Recorded


 


Amoxic-Pot Clav 875-125Mg 1 tab PO BID 10 Days #20 tab 22





[Augmentin 875-125]  


 


Ketorolac [Toradol] 10 mg PO Q8HR PRN 7 Days #21 tab 22











                                    Allergies











Allergy/AdvReac Type Severity Reaction Status Date / Time


 


No Known Allergies Allergy   Verified 22 15:39














Review of Systems


ROS Statement: 


Those systems with pertinent positive or pertinent negative responses have been 

documented in the HPI.





ROS Other: All systems not noted in ROS Statement are negative.





Past Medical History


Past Medical History: No Reported History


History of Any Multi-Drug Resistant Organisms: None Reported


Past Surgical History:  Section, Tubal Ligation


Past Psychological History: Anxiety, Depression


Smoking Status: Former smoker


Past Alcohol Use History: Occasional


Past Drug Use History: Marijuana





- Past Family History


  ** Sister(s)


Additional Family Medical History / Comment(s): skin CA





  ** Mother


Additional Family Medical History / Comment(s): Cervial CA





  ** Father


Family Medical History: Hypertension


Additional Family Medical History / Comment(s): heart cath with stent





General Exam


Limitations: no limitations


General appearance: alert, in no apparent distress


Head exam: Present: atraumatic, normocephalic, normal inspection


ENT exam: Present: other (decayed right bottom wisdom tooth. fractured right 

bottom molar with minimal erythema. no swelling or drainable abscess)


Neck exam: Present: normal inspection, full ROM.  Absent: tenderness


Respiratory exam: Present: normal lung sounds bilaterally.  Absent: respiratory 

distress, wheezes, rales, rhonchi, stridor


Cardiovascular Exam: Present: regular rate, normal rhythm, normal heart sounds. 

 Absent: systolic murmur, diastolic murmur, rubs, gallop, clicks


Psychiatric exam: Present: normal affect, normal mood


Skin exam: Present: warm, dry, intact, normal color.  Absent: rash





Course


                                   Vital Signs











  22





  15:36


 


Temperature 98.9 F


 


Pulse Rate 80


 


Respiratory 16





Rate 


 


Blood Pressure 119/83


 


O2 Sat by Pulse 100





Oximetry 














Medical Decision Making





- Medical Decision Making


This is a 32-year-old who presents with right dental pain.  There is no definite

 infection however given fractured tooth I will treat for possible early 

infection.








Patient will be discharged with Augmentin and pain control. She is instructed to

 follow-up with dentist.








Dr. Frank is my attending. 








Disposition


Clinical Impression: 


 Pain, dental, Fractured tooth, Dental decay





Disposition: HOME SELF-CARE


Condition: Good


Instructions (If sedation given, give patient instructions):  Toothache (ED)


Additional Instructions: 


Take medication as directed.  It is okay to take Tylenol 3 with Toradol.  It is 

very important to follow up with the dentist.  Return to the emergency 

department experience new, concerning, or worsening symptoms.


Prescriptions: 


Amoxic-Pot Clav 875-125Mg [Augmentin 875-125] 1 tab PO BID 10 Days #20 tab


Ketorolac [Toradol] 10 mg PO Q8HR PRN 7 Days #21 tab


 PRN Reason: Pain


Is patient prescribed a controlled substance at d/c from ED?: No


Referrals: 


None,Stated [Primary Care Provider] - 1-2 days


Time of Disposition: 16:37

## 2022-11-18 ENCOUNTER — HOSPITAL ENCOUNTER (OUTPATIENT)
Dept: HOSPITAL 47 - RADFLMAIN | Age: 32
Discharge: HOME | End: 2022-11-18
Attending: ORTHOPAEDIC SURGERY
Payer: COMMERCIAL

## 2022-11-18 DIAGNOSIS — S72.052A: Primary | ICD-10-CM

## 2022-11-18 PROCEDURE — 73525 CONTRAST X-RAY OF HIP: CPT

## 2022-11-18 PROCEDURE — 27093 INJECTION FOR HIP X-RAY: CPT

## 2022-11-18 PROCEDURE — 73722 MRI JOINT OF LWR EXTR W/DYE: CPT
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
